# Patient Record
Sex: MALE | Race: WHITE | HISPANIC OR LATINO | Employment: OTHER | URBAN - METROPOLITAN AREA
[De-identification: names, ages, dates, MRNs, and addresses within clinical notes are randomized per-mention and may not be internally consistent; named-entity substitution may affect disease eponyms.]

---

## 2020-06-04 ENCOUNTER — HOSPITAL ENCOUNTER (EMERGENCY)
Facility: HOSPITAL | Age: 30
Discharge: HOME | End: 2020-06-04
Attending: EMERGENCY MEDICINE | Admitting: EMERGENCY MEDICINE

## 2020-06-04 VITALS
SYSTOLIC BLOOD PRESSURE: 123 MMHG | DIASTOLIC BLOOD PRESSURE: 77 MMHG | TEMPERATURE: 97.7 F | BODY MASS INDEX: 35.1 KG/M2 | HEIGHT: 69 IN | WEIGHT: 237 LBS | OXYGEN SATURATION: 98 % | RESPIRATION RATE: 16 BRPM | HEART RATE: 96 BPM

## 2020-06-04 DIAGNOSIS — H66.002 NON-RECURRENT ACUTE SUPPURATIVE OTITIS MEDIA OF LEFT EAR WITHOUT SPONTANEOUS RUPTURE OF TYMPANIC MEMBRANE: Primary | ICD-10-CM

## 2020-06-04 PROCEDURE — 99282 EMERGENCY DEPT VISIT SF MDM: CPT

## 2020-06-04 RX ORDER — AMOXICILLIN 875 MG/1
875 TABLET, COATED ORAL 2 TIMES DAILY
Qty: 14 TABLET | Refills: 0 | Status: SHIPPED | OUTPATIENT
Start: 2020-06-04 | End: 2020-06-04 | Stop reason: SDUPTHER

## 2020-06-04 RX ORDER — AMOXICILLIN 875 MG/1
875 TABLET, COATED ORAL 2 TIMES DAILY
Qty: 14 TABLET | Refills: 0 | Status: SHIPPED | OUTPATIENT
Start: 2020-06-04 | End: 2020-06-11

## 2020-06-04 NOTE — ED PROVIDER NOTES
EMERGENCY DEPARTMENT ENCOUNTER    Room Number:  37/37  Date of encounter:  6/4/2020  PCP: Provider, No Known  Historian: Patient      HPI:  Chief Complaint: Earache  A complete HPI/ROS/PMH/PSH/SH/FH are unobtainable due to: None    Context: Gm Wilhelm is a 30 y.o. male who presents to the ED c/o earache.  Onset approximately 3 to 5 days ago.  Initially started as pain in his left ear.  It is an ache.  However he is now having pain also in his right ear.  He has had no associated fever.  No difficulty swallowing or opening his mouth.  No dental pain.  No history of similar.  He has tried ibuprofen with some relief.      PAST MEDICAL HISTORY  Active Ambulatory Problems     Diagnosis Date Noted   • No Active Ambulatory Problems     Resolved Ambulatory Problems     Diagnosis Date Noted   • No Resolved Ambulatory Problems     No Additional Past Medical History   Patient denies any pertinent past medical history.      PAST SURGICAL HISTORY  History reviewed. No pertinent surgical history.      FAMILY HISTORY  History reviewed. No pertinent family history.      SOCIAL HISTORY  Social History     Socioeconomic History   • Marital status:      Spouse name: Not on file   • Number of children: Not on file   • Years of education: Not on file   • Highest education level: Not on file   Tobacco Use   • Smoking status: Former Smoker   • Smokeless tobacco: Never Used   Substance and Sexual Activity   • Alcohol use: Yes     Comment: occ   • Drug use: Never   • Sexual activity: Defer         ALLERGIES  Patient has no known allergies.        REVIEW OF SYSTEMS  Review of Systems     Constitutional: No fever  HEENT: No dental pain  Neuro: No headache      PHYSICAL EXAM    I have reviewed the triage vital signs and nursing notes.    ED Triage Vitals   Temp Heart Rate Resp BP SpO2   06/04/20 1250 06/04/20 1250 06/04/20 1250 06/04/20 1317 06/04/20 1250   97.7 °F (36.5 °C) 96 16 123/77 98 %      Temp src Heart Rate Source Patient  Position BP Location FiO2 (%)   06/04/20 1250 06/04/20 1250 06/04/20 1317 06/04/20 1317 --   Tympanic Monitor Lying Right arm        Physical Exam  GENERAL: not distressed  HENT: nares patent, good dentition, right TM is clear, left TM is erythematous with bulging, no redness or swelling to the external auditory canal, no mastoid tenderness bilaterally, no trismus  EYES: no scleral icterus, EOMI  CV: regular rhythm, regular rate  RESPIRATORY: normal effort  ABDOMEN: soft  MUSCULOSKELETAL: no deformity  NEURO: alert, moves all extremities, follows commands, normal gait, symmetric smile  SKIN: warm, dry        LAB RESULTS  No results found for this or any previous visit (from the past 24 hour(s)).    Ordered the above labs and independently reviewed the results.        RADIOLOGY  No Radiology Exams Resulted Within Past 24 Hours    I ordered the above noted radiological studies. Reviewed by me and discussed with radiologist.  See dictation for official radiology interpretation.      PROCEDURES    Procedures      MEDICATIONS GIVEN IN ER    Medications - No data to display      PROGRESS, DATA ANALYSIS, CONSULTS, AND MEDICAL DECISION MAKING    All labs have been independently reviewed by me.  All radiology studies have been reviewed by me and discussed with radiologist dictating the report.   EKG's independently viewed and interpreted by me.  Discussion below represents my analysis of pertinent findings related to patient's condition, differential diagnosis, treatment plan and final disposition.    Patient presents with left otitis media.  Many give him amoxicillin for this.  I gave him good return precautions.  He shows no signs of any complications such as mastoiditis or septic cavernous sinus thrombosis.             PPE: Both the patient and I wore a surgical mask throughout the entire patient encounter.     AS OF 15:59 VITALS:    BP - 123/77  HR - 96  TEMP - 97.7 °F (36.5 °C) (Tympanic)  O2 SATS -  98%        DIAGNOSIS  Final diagnoses:   Non-recurrent acute suppurative otitis media of left ear without spontaneous rupture of tympanic membrane         DISPOSITION  DISCHARGE    FOLLOW-UP  [facility] Emergency Department  [address] [facility] [place] [address] [phone]  Go in 1 day  If symptoms worsen         Medication List      New Prescriptions    amoxicillin 875 MG tablet  Commonly known as:  AMOXIL  Take 1 tablet by mouth 2 (Two) Times a Day for 7 days.                   Jacinto Simeon II, MD  06/04/20 5869

## 2020-08-14 ENCOUNTER — HOSPITAL ENCOUNTER (EMERGENCY)
Facility: HOSPITAL | Age: 30
Discharge: HOME | End: 2020-08-15
Attending: EMERGENCY MEDICINE | Admitting: EMERGENCY MEDICINE

## 2020-08-14 ENCOUNTER — APPOINTMENT (OUTPATIENT)
Dept: GENERAL RADIOLOGY | Facility: HOSPITAL | Age: 30
End: 2020-08-14

## 2020-08-14 DIAGNOSIS — M70.41 PREPATELLAR BURSITIS OF RIGHT KNEE: Primary | ICD-10-CM

## 2020-08-14 DIAGNOSIS — M79.89 RIGHT LEG SWELLING: ICD-10-CM

## 2020-08-14 PROCEDURE — 73560 X-RAY EXAM OF KNEE 1 OR 2: CPT

## 2020-08-14 PROCEDURE — 99282 EMERGENCY DEPT VISIT SF MDM: CPT

## 2020-08-15 VITALS
HEART RATE: 72 BPM | TEMPERATURE: 97.4 F | BODY MASS INDEX: 35.1 KG/M2 | SYSTOLIC BLOOD PRESSURE: 127 MMHG | DIASTOLIC BLOOD PRESSURE: 80 MMHG | HEIGHT: 69 IN | RESPIRATION RATE: 16 BRPM | OXYGEN SATURATION: 97 %

## 2020-08-15 RX ORDER — NAPROXEN 375 MG/1
375 TABLET ORAL 2 TIMES DAILY PRN
Qty: 20 TABLET | Refills: 0 | OUTPATIENT
Start: 2020-08-15 | End: 2020-08-15 | Stop reason: SDUPTHER

## 2020-08-15 RX ORDER — NAPROXEN 375 MG/1
375 TABLET ORAL 2 TIMES DAILY PRN
Qty: 20 TABLET | Refills: 0 | Status: SHIPPED | OUTPATIENT
Start: 2020-08-15

## 2020-08-15 NOTE — ED PROVIDER NOTES
EMERGENCY DEPARTMENT ENCOUNTER    Room Number:  24/24  Date seen:  8/15/2020  PCP: Provider, No Known  Historian: Patient      HPI:  Chief Complaint: Right knee pain, right leg swelling  A complete HPI/ROS/PMH/PSH/SH/FH are unobtainable due to: Nothing  Context: Gm Wilhelm is a 30 y.o. male who presents to the ED c/o right knee pain and right leg swelling.  He reports that he works as a  and he had been working on his knees about 3 weeks ago and when he stood up he felt a pop in the right knee.  He has had persistent pain in the right knee since then and is also developed right leg swelling.  He has has also had minimal pain in the left knee but not as severe as the right.  He denies fever or chills.  He has had no chest pain or shortness of air.  He denies history of DVT.  He has had no recent prolonged immobilization or long car rides.  He has not taken anything for the pain at home.            PAST MEDICAL HISTORY  Active Ambulatory Problems     Diagnosis Date Noted   • No Active Ambulatory Problems     Resolved Ambulatory Problems     Diagnosis Date Noted   • No Resolved Ambulatory Problems     No Additional Past Medical History         PAST SURGICAL HISTORY  No past surgical history on file.      FAMILY HISTORY  No family history on file.      SOCIAL HISTORY  Social History     Socioeconomic History   • Marital status:      Spouse name: Not on file   • Number of children: Not on file   • Years of education: Not on file   • Highest education level: Not on file   Tobacco Use   • Smoking status: Former Smoker   • Smokeless tobacco: Never Used   Substance and Sexual Activity   • Alcohol use: Yes     Comment: occ   • Drug use: Never   • Sexual activity: Defer         ALLERGIES  Patient has no known allergies.        REVIEW OF SYSTEMS  Review of Systems   Review of all 14 systems is negative other than stated in the HPI above.      PHYSICAL EXAM  ED Triage Vitals   Temp Heart Rate Resp BP SpO2    08/14/20 2126 08/14/20 2126 08/14/20 2317 08/14/20 2317 08/14/20 2126   97.4 °F (36.3 °C) 85 16 148/81 97 %      Temp src Heart Rate Source Patient Position BP Location FiO2 (%)   -- -- -- -- --                GENERAL: Awake and alert, no acute distress  HENT: nares patent  EYES: no scleral icterus  CV: regular rhythm, normal rate  RESPIRATORY: normal effort  MUSCULOSKELETAL: no deformity.  There is mild soft tissue swelling over the right patellar tendon with mild point tenderness present.  There is no erythema or warmth.  Stable Lockman's of the right knee.  No appreciable knee effusion.  Knee extensor mechanism is intact.  There is 1+ pitting edema of the right lower extremity.  2+ right DP and PT pulses.  Brisk cap refill in all toes.  Normal sensation to light touch throughout the right lower extremity.  NEURO: alert, moves all extremities, follows commands  SKIN: warm, dry, no erythema throughout the right lower extremity    Vital signs and nursing notes reviewed.        RADIOLOGY  Xr Knee 1 Or 2 View Right    Result Date: 8/15/2020  RIGHT KNEE X-RAYS  CLINICAL HISTORY: Right knee pain and swelling  AP and lateral views demonstrate slight narrowing of the medial compartment of the knee joint and are otherwise unremarkable. There is no evidence of recent or old fracture or subluxation. No joint effusion is evident.  This report was finalized on 8/15/2020 12:40 AM by Dr. Jamel Fletcher M.D.        Ordered the above noted radiological studies. Reviewed by me in PACS.            PROCEDURES  Procedures          MEDICATIONS GIVEN IN ER  Medications - No data to display                MEDICAL DECISION MAKING, PROGRESS, and CONSULTS         MDM     30-year-old male with 3 weeks of right knee pain.  Exam is most suggestive of prepatellar bursitis.  Plain films of the right knee are negative.  He does also have moderate swelling of the right lower extremity without erythema or warmth.  Ultrasound technician  unavailable at this time therefore I have informed the patient to come back to the hospital tomorrow to have a duplex ultrasound of the right lower extremity to rule out DVT.  Otherwise take naproxen as needed for the bursitis and follow-up with orthopedics as outpatient.    DIAGNOSIS  Final diagnoses:   Prepatellar bursitis of right knee   Right leg swelling         DISPOSITION  DISCHARGE    Patient discharged in stable condition.    Reviewed implications of results, diagnosis, meds, responsibility to follow up, warning signs and symptoms of possible worsening, potential complications and reasons to return to ER.    Patient/Family voiced understanding of above instructions.    Discussed plan for discharge, as there is no emergent indication for admission. Patient referred to primary care provider for BP management due to today's BP. Pt/family is agreeable and understands need for follow up and repeat testing.  Pt is aware that discharge does not mean that nothing is wrong but it indicates no emergency is present that requires admission and they must continue care with follow-up as given below or physician of their choice.     FOLLOW-UP  Linden Hess II, MD  [address] [address]  [phone]    Schedule an appointment as soon as possible for a visit            Medication List      New Prescriptions    naproxen 375 MG tablet  Commonly known as:  NAPROSYN  Take 1 tablet by mouth 2 (Two) Times a Day As Needed for Mild Pain .                      Latest Documented Vital Signs:  As of 02:48  BP- 127/80 HR- 72 Temp- 97.4 °F (36.3 °C) O2 sat- 97%        --    Please note that portions of this were completed with a voice recognition program.            Germán Bhatt MD  08/15/20 0252

## 2020-08-15 NOTE — DISCHARGE INSTRUCTIONS
Please call [phone] tomorrow at 8:30 am to arrange an ultrasound of your right leg to check for blood clot.

## 2020-08-15 NOTE — ED NOTES
"PT ambulated to triage window with steady gait. Masked upon arrival.    R knee swelling down to foot x 3 weeks. Was at work on his knees and stood up and felt a \"pop\". Taking Advil for the pain. Name, , SSN, and address verified with patient. Arm band placed. First look complete.    This nurse wore appropriate PPE during all interactions with this patient.       Lottie Bhatt RN  20       Lottie Bhatt RN  20    "